# Patient Record
Sex: MALE | Race: WHITE | NOT HISPANIC OR LATINO | Employment: FULL TIME | URBAN - METROPOLITAN AREA
[De-identification: names, ages, dates, MRNs, and addresses within clinical notes are randomized per-mention and may not be internally consistent; named-entity substitution may affect disease eponyms.]

---

## 2018-08-17 ENCOUNTER — OFFICE VISIT (OUTPATIENT)
Dept: ORTHOPEDICS | Facility: CLINIC | Age: 48
End: 2018-08-17
Payer: COMMERCIAL

## 2018-08-17 ENCOUNTER — HOSPITAL ENCOUNTER (OUTPATIENT)
Dept: RADIOLOGY | Facility: HOSPITAL | Age: 48
Discharge: HOME | End: 2018-08-17
Attending: ORTHOPAEDIC SURGERY
Payer: COMMERCIAL

## 2018-08-17 VITALS
BODY MASS INDEX: 29.4 KG/M2 | WEIGHT: 210 LBS | HEIGHT: 71 IN | HEART RATE: 108 BPM | SYSTOLIC BLOOD PRESSURE: 153 MMHG | DIASTOLIC BLOOD PRESSURE: 104 MMHG

## 2018-08-17 DIAGNOSIS — M22.2X2 PATELLOFEMORAL PAIN SYNDROME OF LEFT KNEE: Primary | ICD-10-CM

## 2018-08-17 DIAGNOSIS — M25.562 LEFT KNEE PAIN, UNSPECIFIED CHRONICITY: Primary | ICD-10-CM

## 2018-08-17 DIAGNOSIS — M25.562 LEFT KNEE PAIN, UNSPECIFIED CHRONICITY: ICD-10-CM

## 2018-08-17 DIAGNOSIS — Q74.1 BIPARTITE PATELLA: ICD-10-CM

## 2018-08-17 PROBLEM — M22.2X9 PATELLA-FEMORAL SYNDROME: Status: ACTIVE | Noted: 2018-08-17

## 2018-08-17 PROCEDURE — 73560 X-RAY EXAM OF KNEE 1 OR 2: CPT | Mod: TC,PN,LT

## 2018-08-17 PROCEDURE — 99203 OFFICE O/P NEW LOW 30 MIN: CPT | Mod: 25,S$GLB,, | Performed by: ORTHOPAEDIC SURGERY

## 2018-08-17 PROCEDURE — 99999 PR PBB SHADOW E&M-EST. PATIENT-LVL III: CPT | Mod: PBBFAC,,, | Performed by: ORTHOPAEDIC SURGERY

## 2018-08-17 PROCEDURE — 73560 X-RAY EXAM OF KNEE 1 OR 2: CPT | Mod: 26,LT,, | Performed by: RADIOLOGY

## 2018-08-17 PROCEDURE — 20610 DRAIN/INJ JOINT/BURSA W/O US: CPT | Mod: LT,S$GLB,, | Performed by: ORTHOPAEDIC SURGERY

## 2018-08-17 RX ORDER — TRIAMCINOLONE ACETONIDE 40 MG/ML
80 INJECTION, SUSPENSION INTRA-ARTICULAR; INTRAMUSCULAR
Status: DISCONTINUED | OUTPATIENT
Start: 2018-08-17 | End: 2018-08-17 | Stop reason: HOSPADM

## 2018-08-17 RX ADMIN — TRIAMCINOLONE ACETONIDE 80 MG: 40 INJECTION, SUSPENSION INTRA-ARTICULAR; INTRAMUSCULAR at 06:08

## 2018-08-17 NOTE — PROCEDURES
Large Joint Aspiration/Injection: L knee  Date/Time: 8/17/2018 6:17 PM  Performed by: Gabriel Vasquez DO  Authorized by: Gabriel Vasquez, DO     Consent Done?:  Yes (Verbal)  Indications:  Pain, joint swelling and diagnostic evaluation  Procedure site marked: Yes    Timeout: Prior to procedure the correct patient, procedure, and site was verified      Location:  Knee  Site:  L knee  Prep: Patient was prepped and draped in usual sterile fashion    Ultrasonic Guidance for needle placement: No  Needle size:  22 G  Approach:  Anterolateral  Medications:  80 mg triamcinolone acetonide 40 mg/mL  Patient tolerance:  Patient tolerated the procedure well with no immediate complications

## 2018-08-17 NOTE — PROGRESS NOTES
Subjective:      Patient ID: Sanchez Schaefer is a 48 y.o. male.    Chief Complaint: Pain of the Left Knee    Referring Provider: No referring provider defined for this encounter.     HPI:  Mr. Schaefer is a 48-year-old male who presented today with a 17 year history of left knee pain increasing in intensity over the last 6 months of insidious onset.  He has intermittent exacerbations throughout the years with his last exacerbation on 08/14/2018.  His symptoms originally began in 1991 when he was starting carpet and kicked a knife blade with his knee. With this exacerbation he stated he was kneeling on a floor installing metal.  He gets swelling giving way and locking.  Straightening and bending his knee increases symptoms. Standing from a squatted position increases symptoms.  He has taken NSAIDs without help.  He has not worn a brace or had injections. Ice, heat, and elevation improve his symptoms.    Past Medical History:   Diagnosis Date     *  *  *  * Hypertension  Anxiety  IBS  Nephrolithiasis  Headaches      Past Surgical History:   Procedure Laterality Date    APPENDECTOMY      age 14    KNEE ARTHROSCOPY Left      *  * 1991  Hemorrhoidectomy  Colonoscopy     Review of patient's allergies indicates:  No Known Allergies  Social History     Occupational History     next   Tobacco Use    Smoking status: Current Every Day Smoker    Smokeless tobacco: Never Used   Substance and Sexual Activity    Alcohol use: No     Frequency: Never    Drug use: No    Sexual activity: Not Currently     Partners: Female      Family History   Problem Relation Age of Onset    No Known Problems Mother     Diabetes Father     Cancer Father     Hypertension Father     Hypertension Sister     No Known Problems Brother     No Known Problems Sister      Previous Hospitalizations:  Appendectomy    Review of Systems   Constitution: Negative for chills and fever.   HENT: Negative for hoarse voice.    Eyes:  Negative for double vision.   Cardiovascular: Negative for chest pain.   Respiratory: Negative for cough.    Endocrine: Negative for polydipsia.   Hematologic/Lymphatic: Does not bruise/bleed easily.   Skin: Negative for poor wound healing.   Musculoskeletal: Positive for joint pain and joint swelling.   Gastrointestinal: Negative for constipation and diarrhea.   Genitourinary: Negative for flank pain.   Neurological: Negative for dizziness.   Psychiatric/Behavioral: The patient is nervous/anxious.    Allergic/Immunologic: Negative for environmental allergies.          Objective:      Physical Exam:  General: AAOx3.  No acute distress  HEENT: Normocephalic, PEARLA EOMI, Fair Dentition  Neck: Supple, No JVD  Chest: Symetric, equal excursion on inspiration  Abdomen: Soft NTND  Vascular:  Pulses intact and equal bilaterally.  Capillary refill less than 3 seconds and equal bilaterally  Neurologic:  Pinprick and soft touch intact and equal bilaterally  Integment:  Resolving medial para patella ecchymosis bilateral knees  Extremity:  Knee:  Extension/flexion equal bilaterally 0/128°.  Mild crepitus with motion both knees.  Mild effusion left knee.  Positive patellar load/compression left knee.  Mildly positive patella apprehension/relocation left knee. Stable with varus/valgus stressing both knees.  Stable with Lachman/drawer both knees.  Negative joint line tenderness both knees.  [place] negative both knees.  [place] negative both knees.  Nontender over the medial or lateral collateral ligament both knees.  Nontender at the anserine insertion both knees.  No swelling at the anserine insertion both knees.  Radiography:  Personally reviewed x-rays of the left knee completed on 08/17/2018 which showed mild arthritic changes no fracture or dislocation.  There is a bipartite patella of the supra lateral patella margin      Assessment:       Impression:     1. Patellofemoral pain syndrome, left knee    2. Bipartite  patella, left knee          Plan:       1.  Discussed physical examination and radiographic findings with the patient. Sanchez understands that he has a bipartite patella but primarily patellofemoral impaction syndrome.  He could trial conservative management or consider surgical intervention. He stated he would like to trial conservative management.    2.  Offered a steroid injection to the left knee, he elected to proceed.  3.  Discussed with the patient in detail that he needs to wear his braces when ambulating and participating in activities.  4.  Discussed with the patient using NSAIDs.  5.  Offered referred to physical therapy declined for now.  6.  Home exercises to include quadriceps and VMO exercises were discussed with the patient.  7.  Use of knee pads when working was discussed in detail with the patient he understands he should purchase knee pads and wear them at all times while he is doing his vocation.  8.  Follow up p.r.n.

## 2019-03-29 ENCOUNTER — TELEPHONE (OUTPATIENT)
Dept: CARDIOLOGY | Facility: CLINIC | Age: 49
End: 2019-03-29
